# Patient Record
Sex: MALE | Race: WHITE | Employment: OTHER | URBAN - METROPOLITAN AREA
[De-identification: names, ages, dates, MRNs, and addresses within clinical notes are randomized per-mention and may not be internally consistent; named-entity substitution may affect disease eponyms.]

---

## 2017-06-02 ENCOUNTER — APPOINTMENT (OUTPATIENT)
Dept: LAB | Facility: CLINIC | Age: 82
End: 2017-06-02
Attending: RADIOLOGY
Payer: MEDICARE

## 2017-06-02 DIAGNOSIS — C61 MALIGNANT NEOPLASM OF PROSTATE (H): ICD-10-CM

## 2017-06-02 LAB — PSA SERPL-MCNC: 0.27 UG/L (ref 0–4)

## 2017-06-06 ENCOUNTER — OFFICE VISIT (OUTPATIENT)
Dept: RADIATION ONCOLOGY | Facility: CLINIC | Age: 82
End: 2017-06-06
Attending: RADIOLOGY
Payer: MEDICARE

## 2017-06-06 VITALS — SYSTOLIC BLOOD PRESSURE: 149 MMHG | WEIGHT: 164.3 LBS | HEART RATE: 71 BPM | DIASTOLIC BLOOD PRESSURE: 73 MMHG

## 2017-06-06 DIAGNOSIS — C61 MALIGNANT NEOPLASM OF PROSTATE (H): Primary | ICD-10-CM

## 2017-06-06 PROCEDURE — 99213 OFFICE O/P EST LOW 20 MIN: CPT | Performed by: RADIOLOGY

## 2017-06-06 NOTE — PROGRESS NOTES
RADIATION ONCOLOGY FOLLOW-UP  DATE OF VISIT: 2017     NAME: Juan Muniz   MRN: [id number]  : 10/21/1932     DISEASE TREATED: Intermediate-risk(Unfavorable, >50% biopsy cores+) adenocarcinoma of the prostate, [id number], [place] 3 + 4 = 7, pre-treatment PSA= 8       TYPE OF RADIOTHERAPY DELIVERED: 7560 cGy in [place] IMRT in 42 fractions on 10 MV (Elekta, 7 fields)     INTERVAL SINCE RADIOTHERAPY COMPLETION: 6 years and 10 months. Completed on 2010.      HORMONAL THERAPY:   6 Months of Lupron + XRT, First Lupron 2009, Last Lupron 2009    HISTORY OF PRESENT ILLNESS: Mr. Cruz is an 84-year-old man with prostate cancer treated definitively with combined radiotherapy and ADT. He continues to do well without any pressing issues or complaints today. Denies any changes in regards to his urinary symptoms. AUA is 6/35 (previously 4/35). PSA remains low at 0.27.      PHYSICAL EXAMINATION:  VITALS:/73  Pulse 71  Wt 74.5 kg (164 lb 4.8 oz)  GENERAL: Appears well, alert, oriented, and in NAD  ANGELIQUE: Deferred     LABS:  PSA  2017 0.27  2016 0.24  2015 0.25  2015  0.27   2014  0.28    2014  0.24    2014  0.38    2013  0.23    10/30/2012  0.27    2012      0.34  2011      0.17            IMPRESSION: cNED     RECOMMENDATION: Follow up in 2018 with repeat PSA.     The patient was seen and discussed with staff, Dr. Mccarty.     Fred Rachel MD  Resident Radiation Oncology     I saw the patient with the resident.  I agree with the resident's note and plan of care.      VALERIE Mccarty M.D.  Department of Radiation Oncology  [facility]  Patient Care Team:  Marie Billy PA-C as PCP - General

## 2017-06-06 NOTE — PROGRESS NOTES
FOLLOW-UP VISIT    Patient Name: Juan Muniz      : 10/21/1932     Age: 84 year old        ______________________________________________________________________________     Chief Complaint   Patient presents with     Cancer     6 yr 10 month fup for rad therapy to prostate bed     /73  Pulse 71  Wt 74.5 kg (164 lb 4.8 oz)     Date Radiation Completed: 7/23/10    Pain  Denies    Labs  Other Labs: Yes: PSA  0.27    Imaging  None        PSA:   PSA   Date Value Ref Range Status   2017 0.27 0 - 4 ug/L Final     Comment:     Assay Method:  Chemiluminescence using Siemens Vista analyzer   2016 0.24 0 - 4 ug/L Final   2015 0.25 0 - 4 ug/L Final   2015 0.27 0 - 4 ug/L Final   2014 0.28 0 - 4 ug/L Final   2014 0.24 0 - 4 ug/L Final     Comment:     PSA results are about 7% lower than our prior method due to a methodology   change   on 2011.     2014 0.38 0 - 4 ug/L Final     Comment:     PSA results are about 7% lower than our prior method due to a methodology   change   on 2011.   2013 0.23 0 - 4 ug/L Final     Comment:     PSA results are about 7% lower than our prior method due to a methodology   change   on 2011.   10/30/2012 0.27 0 - 4 ug/L Final     Comment:     PSA results are about 7% lower than our prior method due to a methodology   change   on 2011.   2012 0.34 0 - 4 ug/L Final     Comment:     PSA results are about 7% lower than our prior method due to a methodology   change   on 2011.     Testosterone Level: No results found for: TESTOSTTOTAL    On-Study AUA Symptom Score (PQ)       Diarrhea: 0- None    Constipation: 0- None      Other Appointments:     MD Name:  Appointment Date:    MD Name: Appointment Date:   MD Name: Appointment Date:   Other Appointment Notes:     Residual Radiation side effect: none     Additional Instructions:

## 2017-06-06 NOTE — MR AVS SNAPSHOT
After Visit Summary   6/6/2017    Juan Muniz    MRN: [id number]           Patient Information     Date Of Birth          10/21/1932        Visit Information        Provider Department      6/6/2017 10:00 AM Idalmis Mccarty MD Radiation Oncology Clinic        Today's Diagnoses     Malignant neoplasm of prostate (H)    -  1       Follow-ups after your visit        Follow-up notes from your care team     Return in about 14 months (around 7/23/2018).      Your next 10 appointments already scheduled     Jun 04, 2018 10:30 AM CDT   LAB with ACUTE CARE LAB [facility] [place], Lab ([facility])    [address] [address]              Patient must bring picture ID.  Patient should be prepared to give a urine specimen  Please do not eat 10-12 hours before your appointment if you are coming in fasting for labs on lipids, cholesterol, or glucose (sugar).  Pregnant women should follow their Care Team instructions. Water with medications is okay. Do not drink coffee or other fluids.   If you have concerns about taking  your medications, please ask at office or if scheduling via RAREFORM, send a message by clicking on Secure Messaging, Message Your Care Team.            Jun 06, 2018 10:30 AM CDT   Return Visit with Idalmis Mccarty MD   Radiation Oncology Clinic ([facility])    [facility]  1st Floor  [address] [address] [phone]              Future tests that were ordered for you today     Open Future Orders        Priority Expected Expires Ordered    PSA tumor marker Routine 6/6/2018 12/3/2018 6/6/2017            Who to contact     Please call your clinic at [phone] to:    Ask questions about your health    Make or cancel appointments    Discuss your medicines    Learn about your test results    Speak to your doctor   If you have compliments or concerns about an  experience at your clinic, or if you wish to file a complaint, please contact [facility] Patient Relations at [phone] or email us at [email or web address]         Additional Information About Your Visit        NextPrincipleshart Information     Priori Data is an electronic gateway that provides easy, online access to your medical records. With Priori Data, you can request a clinic appointment, read your test results, renew a prescription or communicate with your care team.     To sign up for Priori Data visit the website at [email or web address]   You will be asked to enter the access code listed below, as well as some personal information. Please follow the directions to create your username and password.     Your access code is: [email or web address]  Expires: 2017 10:25 AM     Your access code will  in 90 days. If you need help or a new code, please contact your [facility] or call [phone] for assistance.        Care EveryWhere ID     This is your Care EveryWhere ID. This could be used by other organizations to access your [place] medical records  [id number]        Your Vitals Were     Pulse                   71            Blood Pressure from Last 3 Encounters:   17 149/73   16 149/73   06/22/15 147/60    Weight from Last 3 Encounters:   17 74.5 kg (164 lb 4.8 oz)   16 74.3 kg (163 lb 12.8 oz)   06/22/15 74.6 kg (164 lb 6.4 oz)               Primary Care Provider Office Phone # Fax #    Marie Billy PA-C [phone]       [facility] [address] [place] [address]        Thank you!     Thank you for choosing RADIATION ONCOLOGY CLINIC  for your care. Our goal is always to provide you with excellent care. Hearing back from our patients is one way we can continue to improve our services. Please take a few minutes to complete the written survey that you may receive in the mail after your visit with us. Thank  you!             Your Updated Medication List - Protect others around you: Learn how to safely use, store and throw away your medicines at www.disposemymeds.org.          This list is accurate as of: 6/6/17 11:02 AM.  Always use your most recent med list.                   Brand Name Dispense Instructions for use    ASPIRIN PO      Take 81 mg by mouth daily       CALCITRIOL PO      Take 0.25 mcg by mouth daily       glipiZIDE XL 10 MG 24 hr tablet   Generic drug:  glipiZIDE      Take 1 tablet by mouth daily.       JANUVIA 100 MG tablet   Generic drug:  sitagliptin      Take  by mouth daily.       latanoprost 0.005 % ophthalmic solution    XALATAN     Place 1 drop Into the left eye At Bedtime       LIPITOR 10 MG tablet   Generic drug:  atorvastatin      Take 1 tablet by mouth At Bedtime. Take daily at bedtime       metoprolol 50 MG tablet    LOPRESSOR     Take 50 mg by mouth daily.       SYNTHROID PO      Take  by mouth.

## 2017-06-06 NOTE — LETTER
2017       RE: Juan Muniz  [address] [facility] [address]     Dear Colleague,    Thank you for referring your patient, Juan Muniz, to the RADIATION ONCOLOGY CLINIC. Please see a copy of my visit note below.    RADIATION ONCOLOGY FOLLOW-UP  DATE OF VISIT: 2017     NAME: Juan Muniz   MRN: [id number]  : 10/21/1932     DISEASE TREATED: Intermediate-risk(Unfavorable, >50% biopsy cores+) adenocarcinoma of the prostate, [id number], [place] 3 + 4 = 7, pre-treatment PSA= 8       TYPE OF RADIOTHERAPY DELIVERED: 7560 cGy in [place] IMRT in 42 fractions on 10 MV (Elekta, 7 fields)     INTERVAL SINCE RADIOTHERAPY COMPLETION: 6 years and 10 months. Completed on 2010.      HORMONAL THERAPY:   6 Months of Lupron + XRT, First Lupron 2009, Last Lupron 2009    HISTORY OF PRESENT ILLNESS: Mr. Cruz is an 84-year-old man with prostate cancer treated definitively with combined radiotherapy and ADT. He continues to do well without any pressing issues or complaints today. Denies any changes in regards to his urinary symptoms. AUA is 6/35 (previously 4/35). PSA remains low at 0.27.      PHYSICAL EXAMINATION:  VITALS:/73  Pulse 71  Wt 74.5 kg (164 lb 4.8 oz)  GENERAL: Appears well, alert, oriented, and in NAD  ANGELIQUE: Deferred     LABS:  PSA  2017 0.27  2016 0.24  2015 0.25  2015  0.27   2014  0.28    2014  0.24    2014  0.38    2013  0.23    10/30/2012  0.27    2012      0.34  2011      0.17            IMPRESSION: cNED     RECOMMENDATION: Follow up in 2018 with repeat PSA.     The patient was seen and discussed with staff, Dr. Mccarty.     Fred Rachel MD  Resident Radiation Oncology     I saw the patient with the resident.  I agree with the resident's note and plan of care.      VALERIE Mccarty M.D.  Department of Radiation Oncology  [facility]    CC  Patient Care Team:  Marie Billy PA-C as  PCP - General    FOLLOW-UP VISIT    Patient Name: Juan Muniz      : 10/21/1932     Age: 84 year old        ______________________________________________________________________________     Chief Complaint   Patient presents with     Cancer     6 yr 10 month fup for rad therapy to prostate bed     /73  Pulse 71  Wt 74.5 kg (164 lb 4.8 oz)     Date Radiation Completed: 7/23/10    Pain  Denies    Labs  Other Labs: Yes: PSA  0.27    Imaging  None        PSA:   PSA   Date Value Ref Range Status   2017 0.27 0 - 4 ug/L Final     Comment:     Assay Method:  Chemiluminescence using Siemens Vista analyzer   2016 0.24 0 - 4 ug/L Final   2015 0.25 0 - 4 ug/L Final   2015 0.27 0 - 4 ug/L Final   2014 0.28 0 - 4 ug/L Final   2014 0.24 0 - 4 ug/L Final     Comment:     PSA results are about 7% lower than our prior method due to a methodology   change   on 2011.     2014 0.38 0 - 4 ug/L Final     Comment:     PSA results are about 7% lower than our prior method due to a methodology   change   on 2011.   2013 0.23 0 - 4 ug/L Final     Comment:     PSA results are about 7% lower than our prior method due to a methodology   change   on 2011.   10/30/2012 0.27 0 - 4 ug/L Final     Comment:     PSA results are about 7% lower than our prior method due to a methodology   change   on 2011.   2012 0.34 0 - 4 ug/L Final     Comment:     PSA results are about 7% lower than our prior method due to a methodology   change   on 2011.     Testosterone Level: No results found for: TESTOSTTOTAL    On-Study AUA Symptom Score (PQ)       Diarrhea: 0- None    Constipation: 0- None      Other Appointments:     MD Name:  Appointment Date:    MD Name: Appointment Date:   MD Name: Appointment Date:   Other Appointment Notes:     Residual Radiation side effect: none     Additional Instructions:       Again, thank you for allowing me  to participate in the care of your patient.      Sincerely,    Idalmis Mccarty MD

## 2018-06-06 ENCOUNTER — OFFICE VISIT (OUTPATIENT)
Dept: RADIATION ONCOLOGY | Facility: CLINIC | Age: 83
End: 2018-06-06
Attending: RADIOLOGY
Payer: MEDICARE

## 2018-06-06 VITALS — WEIGHT: 161.7 LBS | HEART RATE: 63 BPM | SYSTOLIC BLOOD PRESSURE: 159 MMHG | DIASTOLIC BLOOD PRESSURE: 71 MMHG

## 2018-06-06 DIAGNOSIS — C61 MALIGNANT NEOPLASM OF PROSTATE (H): Primary | ICD-10-CM

## 2018-06-06 PROCEDURE — G0463 HOSPITAL OUTPT CLINIC VISIT: HCPCS | Performed by: RADIOLOGY

## 2018-06-06 NOTE — LETTER
2018       RE: Juan Muniz  [address] [facility] [address]     Dear Colleague,    Thank you for referring your patient, Juan Muniz, to the RADIATION ONCOLOGY CLINIC. Please see a copy of my visit note below.    RADIATION ONCOLOGY FOLLOW-UP  DATE OF VISIT: 2018     NAME: Juan Muniz   MRN: [id number]  : 10/21/1932     DISEASE TREATED: Intermediate-risk(Unfavorable, >50% biopsy cores+) adenocarcinoma of the prostate, [id number], [place] 3 + 4 = 7, pre-treatment PSA= 8       TYPE OF RADIOTHERAPY DELIVERED: 7560 cGy in [place] IMRT in 42 fractions on 10 MV (Elekta, 7 fields)     INTERVAL SINCE RADIOTHERAPY COMPLETION: 7 years and 11 months. Completed on 2010.      HORMONAL THERAPY:   6 Months of Lupron + XRT, First Lupron 2009, Last Lupron 2009    HISTORY OF PRESENT ILLNESS: Mr. Cruz is an 85-year-old man with prostate cancer treated definitively with combined radiotherapy and ADT. He continues to do well without any pressing issues or complaints today. Denies any changes in regards to his urinary symptoms. AUA is 6/35 (previously 6/35).LU score is 2/25.  PSA remains low at 0.21.      PHYSICAL EXAMINATION:  VITALS: Vitals: /71  Pulse 63  Wt 73.3 kg (161 lb 11.2 oz)  BMI= There is no height or weight on file to calculate BMI.  GENERAL: Appears well, alert, oriented, and in NAD  ANGELIQUE: Deferred     LABS:  PSA  2018 0.21  2017 0.27  2016 0.24  2015 0.25  2015  0.27   2014  0.28    2014  0.24    2014  0.38    2013  0.23    10/30/2012  0.27    2012      0.34  2011      0.17       IMPRESSION: cNED     RECOMMENDATION: Follow up in 1 year with a NP. We discussed about a referral to urology for erectile dysfunction. However, he wishes to think about it. I recommend Dr. Reva Zimmerman for a consultation.     VALERIE Mccarty M.D.  Department of Radiation Oncology  [facility]  [facility]  Patient Care Team:  Marie Billy PA-C as PCP - General    FOLLOW-UP VISIT    Patient Name: Juan Muniz      : 10/21/1932     Age: 85 year old        ______________________________________________________________________________     Chief Complaint   Patient presents with     Cancer     8 yr fup for rad therapty to prostate bed     Wt 73.3 kg (161 lb 11.2 oz)     Date Radiation Completed: 7/23/10    Pain  Denies    Labs  Other Labs: Yes: PSA  0.21    Imaging  None        PSA:   PSA   Date Value Ref Range Status   2018 0.21 0 - 4 ug/L Final     Comment:     Assay Method:  Chemiluminescence using Siemens Vista analyzer   2017 0.27 0 - 4 ug/L Final     Comment:     Assay Method:  Chemiluminescence using Siemens Vista analyzer   2016 0.24 0 - 4 ug/L Final   2015 0.25 0 - 4 ug/L Final   2015 0.27 0 - 4 ug/L Final   2014 0.28 0 - 4 ug/L Final   2014 0.24 0 - 4 ug/L Final     Comment:     PSA results are about 7% lower than our prior method due to a methodology   change   on 2011.     2014 0.38 0 - 4 ug/L Final     Comment:     PSA results are about 7% lower than our prior method due to a methodology   change   on 2011.   2013 0.23 0 - 4 ug/L Final     Comment:     PSA results are about 7% lower than our prior method due to a methodology   change   on 2011.   10/30/2012 0.27 0 - 4 ug/L Final     Comment:     PSA results are about 7% lower than our prior method due to a methodology   change   on 2011.     Testosterone Level: No results found for: TESTOSTTOTAL    On-Study AUA Symptom Score (PQ)  AUA (American Urological Association) Symptom Score  1. Over the past month, how often have you had a sensation of not emptying your bladder completely after you finished urinating?: Less than 1 time in 5  2. Over the past month, how often have you had to urinate again less than two hours after you finished  urinating?: Less than 1 time in 5  3. Over the past month, how often have you found you stopped and started again several times when you urinated?: Less than 1 time in 5  4. Over the past month, how often have you found it difficult to postpone urination?: Not at all  5. Over the past month, how often have you had a weak urine stream?: Less than 1 time in 5  6. Over the past month, how often have you had to push or strain to begin urinating?: Less than 1 time in 5  7. Over the past month, how many times did you typically get up to urinate from the time you went to bed at night until the time you got up in the morning?: 1 time  AUA Score: 6    Diarrhea: 0- None    Constipation: 0- None      Other Appointments:     MD Name:  Appointment Date:    MD Name: Appointment Date:   MD Name: Appointment Date:   Other Appointment Notes:     Residual Radiation side effect: none     Additional Instructions:     Nurse face-to-face time: Level 2:  5 min face to face time          Again, thank you for allowing me to participate in the care of your patient.      Sincerely,    Idalmis Mccarty MD

## 2018-06-06 NOTE — PROGRESS NOTES
FOLLOW-UP VISIT    Patient Name: Juan Muniz      : 10/21/1932     Age: 85 year old        ______________________________________________________________________________     Chief Complaint   Patient presents with     Cancer     8 yr fup for rad therapty to prostate bed     Wt 73.3 kg (161 lb 11.2 oz)     Date Radiation Completed: 7/23/10    Pain  Denies    Labs  Other Labs: Yes: PSA  0.21    Imaging  None        PSA:   PSA   Date Value Ref Range Status   2018 0.21 0 - 4 ug/L Final     Comment:     Assay Method:  Chemiluminescence using Siemens Vista analyzer   2017 0.27 0 - 4 ug/L Final     Comment:     Assay Method:  Chemiluminescence using Siemens Vista analyzer   2016 0.24 0 - 4 ug/L Final   2015 0.25 0 - 4 ug/L Final   2015 0.27 0 - 4 ug/L Final   2014 0.28 0 - 4 ug/L Final   2014 0.24 0 - 4 ug/L Final     Comment:     PSA results are about 7% lower than our prior method due to a methodology   change   on 2011.     2014 0.38 0 - 4 ug/L Final     Comment:     PSA results are about 7% lower than our prior method due to a methodology   change   on 2011.   2013 0.23 0 - 4 ug/L Final     Comment:     PSA results are about 7% lower than our prior method due to a methodology   change   on 2011.   10/30/2012 0.27 0 - 4 ug/L Final     Comment:     PSA results are about 7% lower than our prior method due to a methodology   change   on 2011.     Testosterone Level: No results found for: TESTOSTTOTAL    On-Study AUA Symptom Score (PQ)  AUA (American Urological Association) Symptom Score  1. Over the past month, how often have you had a sensation of not emptying your bladder completely after you finished urinating?: Less than 1 time in 5  2. Over the past month, how often have you had to urinate again less than two hours after you finished urinating?: Less than 1 time in 5  3. Over the past month, how often have you  found you stopped and started again several times when you urinated?: Less than 1 time in 5  4. Over the past month, how often have you found it difficult to postpone urination?: Not at all  5. Over the past month, how often have you had a weak urine stream?: Less than 1 time in 5  6. Over the past month, how often have you had to push or strain to begin urinating?: Less than 1 time in 5  7. Over the past month, how many times did you typically get up to urinate from the time you went to bed at night until the time you got up in the morning?: 1 time  AUA Score: 6    Diarrhea: 0- None    Constipation: 0- None      Other Appointments:     MD Name:  Appointment Date:    MD Name: Appointment Date:   MD Name: Appointment Date:   Other Appointment Notes:     Residual Radiation side effect: none     Additional Instructions:     Nurse face-to-face time: Level 2:  5 min face to face time

## 2018-06-06 NOTE — MR AVS SNAPSHOT
After Visit Summary   2018    Juan Muniz    MRN: [id number]           Patient Information     Date Of Birth          10/21/1932        Visit Information        Provider Department      2018 10:30 AM Idalmis Mccarty MD Radiation Oncology Clinic        Today's Diagnoses     Malignant neoplasm of prostate (H)    -  1       Follow-ups after your visit        Follow-up notes from your care team     Return in about 1 year (around 2019) for F/U with NP(Carol), PSA before next visit (separate date).      Future tests that were ordered for you today     Open Future Orders        Priority Expected Expires Ordered    PSA tumor marker Routine 2019            Who to contact     Please call your clinic at [phone] to:    Ask questions about your health    Make or cancel appointments    Discuss your medicines    Learn about your test results    Speak to your doctor            Additional Information About Your Visit        MyChart Information     wali is an electronic gateway that provides easy, online access to your medical records. With wali, you can request a clinic appointment, read your test results, renew a prescription or communicate with your care team.     To sign up for Xormist visit the website at [email or web address]   You will be asked to enter the access code listed below, as well as some personal information. Please follow the directions to create your username and password.     Your access code is: [email or web address]  Expires: 2018 10:39 AM     Your access code will  in 90 days. If you need help or a new code, please contact your [facility] or call [phone] for assistance.        Care EveryWhere ID     This is your Care EveryWhere ID. This could be used by other organizations to access your [place] medical records  [id number]        Your Vitals Were     Pulse                   63            Blood  Pressure from Last 3 Encounters:   06/06/18 159/71   06/06/17 149/73   06/06/16 149/73    Weight from Last 3 Encounters:   06/06/18 73.3 kg (161 lb 11.2 oz)   06/06/17 74.5 kg (164 lb 4.8 oz)   06/06/16 74.3 kg (163 lb 12.8 oz)               Primary Care Provider Office Phone # Fax #    Marie Billy PA-C [phone]       [facility] [address] [place] [address]        Equal Access to Services     [facility]: Hadii milady ku hadasho Soomaali, waaxda luqadaha, qaybta kaalmada erin, dora montes . So [facility] [phone].    ATENCIÓN: Si habla español, tiene a nash disposición servicios gratuitos de asistencia lingüística. [facility] [phone].    We comply with applicable federal civil rights laws and [place] laws. We do not discriminate on the basis of race, color, national origin, age, disability, sex, sexual orientation, or gender identity.            Thank you!     Thank you for choosing RADIATION ONCOLOGY CLINIC  for your care. Our goal is always to provide you with excellent care. Hearing back from our patients is one way we can continue to improve our services. Please take a few minutes to complete the written survey that you may receive in the mail after your visit with us. Thank you!             Your Updated Medication List - Protect others around you: Learn how to safely use, store and throw away your medicines at www.disposemymeds.org.          This list is accurate as of 6/6/18 10:39 AM.  Always use your most recent med list.                   Brand Name Dispense Instructions for use Diagnosis    ASPIRIN PO      Take 81 mg by mouth daily        CALCITRIOL PO      Take 0.25 mcg by mouth daily        glipiZIDE XL 10 MG 24 hr tablet   Generic drug:  glipiZIDE      Take 1 tablet by mouth daily.        JANUVIA 100 MG tablet   Generic drug:  sitagliptin      Take  by mouth daily.        latanoprost 0.005 % ophthalmic solution    XALATAN     Place 1 drop Into  the left eye At Bedtime        LIPITOR 10 MG tablet   Generic drug:  atorvastatin      Take 1 tablet by mouth At Bedtime. Take daily at bedtime        metoprolol tartrate 50 MG tablet    LOPRESSOR     Take 50 mg by mouth daily.        SYNTHROID PO      Take  by mouth.

## 2018-06-06 NOTE — PROGRESS NOTES
RADIATION ONCOLOGY FOLLOW-UP  DATE OF VISIT: 2018     NAME: Juan Muniz   MRN: [id number]  : 10/21/1932     DISEASE TREATED: Intermediate-risk(Unfavorable, >50% biopsy cores+) adenocarcinoma of the prostate, [id number], [place] 3 + 4 = 7, pre-treatment PSA= 8       TYPE OF RADIOTHERAPY DELIVERED: 7560 cGy in [place] IMRT in 42 fractions on 10 MV (Elekta, 7 fields)     INTERVAL SINCE RADIOTHERAPY COMPLETION: 7 years and 11 months. Completed on 2010.      HORMONAL THERAPY:   6 Months of Lupron + XRT, First Lupron 2009, Last Lupron 2009    HISTORY OF PRESENT ILLNESS: Mr. Cruz is an 85-year-old man with prostate cancer treated definitively with combined radiotherapy and ADT. He continues to do well without any pressing issues or complaints today. Denies any changes in regards to his urinary symptoms. AUA is 6/35 (previously 6/35).LU score is 2/25.  PSA remains low at 0.21.      PHYSICAL EXAMINATION:  VITALS: Vitals: /71  Pulse 63  Wt 73.3 kg (161 lb 11.2 oz)  BMI= There is no height or weight on file to calculate BMI.  GENERAL: Appears well, alert, oriented, and in NAD  ANGELIQUE: Deferred     LABS:  PSA  2018 0.21  2017 0.27  2016 0.24  2015 0.25  2015  0.27   2014  0.28    2014  0.24    2014  0.38    2013  0.23    10/30/2012  0.27    2012      0.34  2011      0.17       IMPRESSION: cNED     RECOMMENDATION: Follow up in 1 year with a NP. We discussed about a referral to urology for erectile dysfunction. However, he wishes to think about it. I recommend Dr. Reva Zimmerman for a consultation.     VALERIE Mccarty M.D.  Department of Radiation Oncology  [facility]    CC  Patient Care Team:  Marie Billy PA-C as PCP - General

## 2019-05-31 DIAGNOSIS — C61 MALIGNANT NEOPLASM OF PROSTATE (H): ICD-10-CM

## 2019-05-31 LAB — PSA SERPL-MCNC: 0.35 UG/L (ref 0–4)

## 2019-06-06 ENCOUNTER — OFFICE VISIT (OUTPATIENT)
Dept: RADIATION ONCOLOGY | Facility: CLINIC | Age: 84
End: 2019-06-06
Attending: NURSE PRACTITIONER
Payer: MEDICARE

## 2019-06-06 VITALS — HEART RATE: 64 BPM | DIASTOLIC BLOOD PRESSURE: 58 MMHG | WEIGHT: 158 LBS | SYSTOLIC BLOOD PRESSURE: 127 MMHG

## 2019-06-06 DIAGNOSIS — C61 PROSTATE CANCER (H): Primary | ICD-10-CM

## 2019-06-06 PROCEDURE — G0463 HOSPITAL OUTPT CLINIC VISIT: HCPCS | Performed by: NURSE PRACTITIONER

## 2019-06-06 RX ORDER — ATORVASTATIN CALCIUM 10 MG/1
10 TABLET, FILM COATED ORAL DAILY
COMMUNITY
Start: 2014-01-29

## 2019-06-06 RX ORDER — METOPROLOL TARTRATE 50 MG
50 TABLET ORAL 2 TIMES DAILY
COMMUNITY
Start: 2018-10-10

## 2019-06-06 RX ORDER — LEVOTHYROXINE SODIUM 88 UG/1
88 TABLET ORAL DAILY
COMMUNITY
Start: 2014-01-09

## 2019-06-06 RX ORDER — AMLODIPINE BESYLATE 5 MG/1
5 TABLET ORAL DAILY
COMMUNITY
Start: 2017-05-18

## 2019-06-06 RX ORDER — GLIMEPIRIDE 2 MG/1
2 TABLET ORAL DAILY
COMMUNITY
Start: 2018-10-10

## 2019-06-06 NOTE — LETTER
2019       RE: Juan Muniz  [address] [facility] [address]     Dear Colleague,    Thank you for referring your patient, Juan Muniz, to the RADIATION ONCOLOGY CLINIC. Please see a copy of my visit note below.    FOLLOW-UP VISIT    Patient Name: Juan Muniz      : 10/21/1932     Age: 86 year old        ______________________________________________________________________________     Chief Complaint   Patient presents with     Cancer     Follow-up to Radiation Therapy.     /58   Pulse 64   Wt 71.7 kg (158 lb)      Date Radiation Completed: 7/23/10    Pain  Denies    Labs  Other Labs: Yes:   PSA   Date Value Ref Range Status   2019 0.35 0 - 4 ug/L Final     Comment:     Assay Method:  Chemiluminescence using Siemens Vista analyzer       Imaging  None        PSA:   PSA   Date Value Ref Range Status   2019 0.35 0 - 4 ug/L Final     Comment:     Assay Method:  Chemiluminescence using Siemens Vista analyzer   2018 0.21 0 - 4 ug/L Final     Comment:     Assay Method:  Chemiluminescence using Siemens Vista analyzer   2017 0.27 0 - 4 ug/L Final     Comment:     Assay Method:  Chemiluminescence using Siemens Vista analyzer   2016 0.24 0 - 4 ug/L Final   2015 0.25 0 - 4 ug/L Final   2015 0.27 0 - 4 ug/L Final   2014 0.28 0 - 4 ug/L Final   2014 0.24 0 - 4 ug/L Final     Comment:     PSA results are about 7% lower than our prior method due to a methodology   change   on 2011.     2014 0.38 0 - 4 ug/L Final     Comment:     PSA results are about 7% lower than our prior method due to a methodology   change   on 2011.   2013 0.23 0 - 4 ug/L Final     Comment:     PSA results are about 7% lower than our prior method due to a methodology   change   on 2011.     Testosterone Level: No results found for: TESTOSTTOTAL    On-Study AUA Symptom Score (PQ)  AUA (American Urological Association)  Symptom Score  1. Over the past month, how often have you had a sensation of not emptying your bladder completely after you finished urinating?: Less than 1 time in 5  2. Over the past month, how often have you had to urinate again less than two hours after you finished urinating?: Not at all  3. Over the past month, how often have you found you stopped and started again several times when you urinated?: Not at all  4. Over the past month, how often have you found it difficult to postpone urination?: Not at all  5. Over the past month, how often have you had a weak urine stream?: Less than 1 time in 5  6. Over the past month, how often have you had to push or strain to begin urinating?: Not at all  7. Over the past month, how many times did you typically get up to urinate from the time you went to bed at night until the time you got up in the morning?: 1 time  AUA Score: 3    Diarrhea: 0- None    Constipation: 0- None    Residual Radiation side effect: None     Additional Instructions:     Nurse face-to-face time: Level 4:  15 min face to face time          RADIATION ONCOLOGY FOLLOW-UP  DATE OF VISIT: 2019     NAME: Juan Muniz   MRN: [id number]  : 10/21/1932     DISEASE TREATED: Intermediate-risk(Unfavorable, >50% biopsy cores+) adenocarcinoma of the prostate, [id number], [place] 3 + 4 = 7, pre-treatment PSA= 8       TYPE OF RADIOTHERAPY DELIVERED: 7560 cGy in [place] IMRT in 42 fractions on 10 MV (Elekta, 7 fields)     INTERVAL SINCE RADIOTHERAPY COMPLETION: 8 years and 11 months. Completed on 2010.      HORMONAL THERAPY:   6 Months of Lupron + XRT, First Lupron 2009, Last Lupron 2009    HISTORY OF PRESENT ILLNESS: Mr. Cruz is an 85-year-old man with prostate cancer treated definitively with combined radiotherapy and ADT. He continues to do well without any pressing issues or complaints today. Denies any changes in regards to his urinary symptoms. AUA is 3/35 (previously ). LU score-  nicely refused to complete form.  PSA remains low at 0.35. He is unsure when his last colonoscopy was.  Denies any bowel issues.    He is getting his boat ready for sailing later today.  He has a sailboat down in [place].     PHYSICAL EXAMINATION:  VITALS: Vitals: /58   Pulse 64   Wt 71.7 kg (158 lb)   GENERAL: Appears well, alert, oriented, and in NAD  ANGELIQUE: Deferred     LABS:  PSA  5/31/19           0.35  06/4/2018 0.21  06/06/2017 0.27  06/02/2016 0.24  12/07/2015 0.25  06/19/2015  0.27   12/29/2014  0.28    07/11/2014  0.24    01/14/2014  0.38    05/20/2013  0.23    10/30/2012  0.27    05/11/2012      0.34  12/12/2011      0.17       IMPRESSION: cNED     RECOMMENDATION: Follow up in 1 year with PSA prior.        Carol Ribeiro NP  Department of Radiation Oncology  [facility]        Again, thank you for allowing me to participate in the care of your patient.      Sincerely,    ENOCH Farrar CNP

## 2019-06-06 NOTE — PROGRESS NOTES
FOLLOW-UP VISIT    Patient Name: Juan Muniz      : 10/21/1932     Age: 86 year old        ______________________________________________________________________________     Chief Complaint   Patient presents with     Cancer     Follow-up to Radiation Therapy.     /58   Pulse 64   Wt 71.7 kg (158 lb)      Date Radiation Completed: 7/23/10    Pain  Denies    Labs  Other Labs: Yes:   PSA   Date Value Ref Range Status   2019 0.35 0 - 4 ug/L Final     Comment:     Assay Method:  Chemiluminescence using Siemens Vista analyzer       Imaging  None        PSA:   PSA   Date Value Ref Range Status   2019 0.35 0 - 4 ug/L Final     Comment:     Assay Method:  Chemiluminescence using Siemens Vista analyzer   2018 0.21 0 - 4 ug/L Final     Comment:     Assay Method:  Chemiluminescence using Siemens Vista analyzer   2017 0.27 0 - 4 ug/L Final     Comment:     Assay Method:  Chemiluminescence using Siemens Vista analyzer   2016 0.24 0 - 4 ug/L Final   2015 0.25 0 - 4 ug/L Final   2015 0.27 0 - 4 ug/L Final   2014 0.28 0 - 4 ug/L Final   2014 0.24 0 - 4 ug/L Final     Comment:     PSA results are about 7% lower than our prior method due to a methodology   change   on 2011.     2014 0.38 0 - 4 ug/L Final     Comment:     PSA results are about 7% lower than our prior method due to a methodology   change   on 2011.   2013 0.23 0 - 4 ug/L Final     Comment:     PSA results are about 7% lower than our prior method due to a methodology   change   on 2011.     Testosterone Level: No results found for: TESTOSTTOTAL    On-Study AUA Symptom Score (PQ)  AUA (American Urological Association) Symptom Score  1. Over the past month, how often have you had a sensation of not emptying your bladder completely after you finished urinating?: Less than 1 time in 5  2. Over the past month, how often have you had to urinate again less than  two hours after you finished urinating?: Not at all  3. Over the past month, how often have you found you stopped and started again several times when you urinated?: Not at all  4. Over the past month, how often have you found it difficult to postpone urination?: Not at all  5. Over the past month, how often have you had a weak urine stream?: Less than 1 time in 5  6. Over the past month, how often have you had to push or strain to begin urinating?: Not at all  7. Over the past month, how many times did you typically get up to urinate from the time you went to bed at night until the time you got up in the morning?: 1 time  AUA Score: 3    Diarrhea: 0- None    Constipation: 0- None    Residual Radiation side effect: None     Additional Instructions:     Nurse face-to-face time: Level 4:  15 min face to face time

## 2019-06-06 NOTE — PROGRESS NOTES
RADIATION ONCOLOGY FOLLOW-UP  DATE OF VISIT: 2019     NAME: Juan Muniz   MRN: [id number]  : 10/21/1932     DISEASE TREATED: Intermediate-risk(Unfavorable, >50% biopsy cores+) adenocarcinoma of the prostate, [id number], [place] 3 + 4 = 7, pre-treatment PSA= 8       TYPE OF RADIOTHERAPY DELIVERED: 7560 cGy in [place] IMRT in 42 fractions on 10 MV (Elekta, 7 fields)     INTERVAL SINCE RADIOTHERAPY COMPLETION: 8 years and 11 months. Completed on 2010.      HORMONAL THERAPY:   6 Months of Lupron + XRT, First Lupron 2009, Last Lupron 2009    HISTORY OF PRESENT ILLNESS: Mr. Cruz is an 85-year-old man with prostate cancer treated definitively with combined radiotherapy and ADT. He continues to do well without any pressing issues or complaints today. Denies any changes in regards to his urinary symptoms. AUA is 3/35 (previously 6/35). LU score- nicely refused to complete form.  PSA remains low at 0.35. He is unsure when his last colonoscopy was.  Denies any bowel issues.    He is getting his boat ready for sailing later today.  He has a sailboat down in [place].     PHYSICAL EXAMINATION:  VITALS: Vitals: /58   Pulse 64   Wt 71.7 kg (158 lb)   GENERAL: Appears well, alert, oriented, and in NAD  ANGELIQUE: Deferred     LABS:  PSA  19           0.35  2018 0.21  2017 0.27  2016 0.24  2015 0.25  2015  0.27   2014  0.28    2014  0.24    2014  0.38    2013  0.23    10/30/2012  0.27    2012      0.34  2011      0.17       IMPRESSION: cNED     RECOMMENDATION: Follow up in 1 year with PSA prior.        Carol Ribeiro NP  Department of Radiation Oncology  [facility]

## 2019-10-01 ENCOUNTER — HEALTH MAINTENANCE LETTER (OUTPATIENT)
Age: 84
End: 2019-10-01

## 2019-12-15 ENCOUNTER — HEALTH MAINTENANCE LETTER (OUTPATIENT)
Age: 84
End: 2019-12-15

## 2020-06-01 DIAGNOSIS — C61 PROSTATE CANCER (H): ICD-10-CM

## 2020-06-01 LAB — PSA SERPL-MCNC: 0.18 UG/L (ref 0–4)

## 2020-06-02 ENCOUNTER — VIRTUAL VISIT (OUTPATIENT)
Dept: RADIATION ONCOLOGY | Facility: CLINIC | Age: 85
End: 2020-06-02
Attending: NURSE PRACTITIONER
Payer: COMMERCIAL

## 2020-06-02 DIAGNOSIS — C61 PROSTATE CANCER (H): Primary | ICD-10-CM

## 2020-06-02 NOTE — PROGRESS NOTES
"RADIATION ONCOLOGY FOLLOW-UP  DATE OF VISIT: 2020     Juan Muniz is a 87 year old male who is being evaluated via a billable telephone visit.      The patient has been notified of following:     \"This telephone visit will be conducted via a call between you and your physician/provider. We have found that certain health care needs can be provided without the need for a physical exam.  This service lets us provide the care you need with a short phone conversation.  If a prescription is necessary we can send it directly to your pharmacy.  If lab work is needed we can place an order for that and you can then stop by our lab to have the test done at a later time.    Telephone visits are billed at different rates depending on your insurance coverage. During this emergency period, for some insurers they may be billed the same as an in-person visit.  Please reach out to your insurance provider with any questions.    If during the course of the call the physician/provider feels a telephone visit is not appropriate, you will not be charged for this service.\"    Patient has given verbal consent for Telephone visit?  Yes    What phone number would you like to be contacted at?     How would you like to obtain your AVS? [employer]    Phone call duration: 6 minutes           NAME: Juan Muniz   MRN: [id number]  : 10/21/1932     DISEASE TREATED: Intermediate-risk(Unfavorable, >50% biopsy cores+) adenocarcinoma of the prostate, [id number], [place] 3 + 4 = 7, pre-treatment PSA= 8       TYPE OF RADIOTHERAPY DELIVERED: 7560 cGy in [place] IMRT in 42 fractions on 10 MV (Elekta, 7 fields)     INTERVAL SINCE RADIOTHERAPY COMPLETION: 9 years and 11 months. Completed on 2010.      HORMONAL THERAPY:   6 Months of Lupron + XRT, First Lupron 2009, Last Lupron 2009    HISTORY OF PRESENT ILLNESS: Mr. Cruz is an 85-year-old man with prostate cancer treated definitively with combined radiotherapy and ADT. He continues to " do well without any pressing issues or complaints today. Denies any changes in regards to his urinary symptoms. AUA is 3/35 (previously 6/35). LU score- has refused in the past.  PSA remains low at 0.18 down from 0.35 last year.  He is unsure when his last colonoscopy was.  Denies any bowel issues.    He is getting his boat ready for sailing later today.  He has a sailboat down in [place].  They just returned from [place].     PHYSICAL EXAMINATION:  VITALS: Vitals: There were no vitals taken for this visit.  GENERAL: Appears well, alert, oriented, and in NAD  ANGELIQUE: Deferred     LABS:  PSA  6/1/2020         0.18  5/31/19           0.35  06/4/2018 0.21  06/06/2017 0.27  06/02/2016 0.24  12/07/2015 0.25  06/19/2015  0.27   12/29/2014  0.28    07/11/2014  0.24    01/14/2014  0.38    05/20/2013  0.23    10/30/2012  0.27    05/11/2012      0.34  12/12/2011      0.17       IMPRESSION: cNED     RECOMMENDATION: Follow up in 1 year with PSA prior.        Carol Ribeiro NP  Department of Radiation Oncology  [facility]

## 2020-06-02 NOTE — LETTER
"    2020         RE: Juan Muniz  [address] [facility] [address]        Dear Colleague,    Thank you for referring your patient, Juan Muniz, to the RADIATION ONCOLOGY CLINIC. Please see a copy of my visit note below.    RADIATION ONCOLOGY FOLLOW-UP  DATE OF VISIT: 2020     Juan Muniz is a 87 year old male who is being evaluated via a billable telephone visit.      The patient has been notified of following:     \"This telephone visit will be conducted via a call between you and your physician/provider. We have found that certain health care needs can be provided without the need for a physical exam.  This service lets us provide the care you need with a short phone conversation.  If a prescription is necessary we can send it directly to your pharmacy.  If lab work is needed we can place an order for that and you can then stop by our lab to have the test done at a later time.    Telephone visits are billed at different rates depending on your insurance coverage. During this emergency period, for some insurers they may be billed the same as an in-person visit.  Please reach out to your insurance provider with any questions.    If during the course of the call the physician/provider feels a telephone visit is not appropriate, you will not be charged for this service.\"    Patient has given verbal consent for Telephone visit?  Yes    What phone number would you like to be contacted at?     How would you like to obtain your AVS? [employer]    Phone call duration: 6 minutes           NAME: Juan Muniz   MRN: [id number]  : 10/21/1932     DISEASE TREATED: Intermediate-risk(Unfavorable, >50% biopsy cores+) adenocarcinoma of the prostate, [id number], [place] 3 + 4 = 7, pre-treatment PSA= 8       TYPE OF RADIOTHERAPY DELIVERED: 7560 cGy in [place] IMRT in 42 fractions on 10 MV (Elekta, 7 fields)     INTERVAL SINCE RADIOTHERAPY COMPLETION: 9 years and 11 months. Completed on 2010.    "   HORMONAL THERAPY:   6 Months of Lupron + XRT, First Lupron 8/2009, Last Lupron 12/2009    HISTORY OF PRESENT ILLNESS: Mr. Cruz is an 85-year-old man with prostate cancer treated definitively with combined radiotherapy and ADT. He continues to do well without any pressing issues or complaints today. Denies any changes in regards to his urinary symptoms. AUA is 3/35 (previously 6/35). LU score- has refused in the past.  PSA remains low at 0.18 down from 0.35 last year.  He is unsure when his last colonoscopy was.  Denies any bowel issues.    He is getting his boat ready for sailing later today.  He has a sailboat down in [place].  They just returned from [place].     PHYSICAL EXAMINATION:  VITALS: Vitals: There were no vitals taken for this visit.  GENERAL: Appears well, alert, oriented, and in NAD  ANGELIQUE: Deferred     LABS:  PSA  6/1/2020         0.18  5/31/19           0.35  06/4/2018 0.21  06/06/2017 0.27  06/02/2016 0.24  12/07/2015 0.25  06/19/2015  0.27   12/29/2014  0.28    07/11/2014  0.24    01/14/2014  0.38    05/20/2013  0.23    10/30/2012  0.27    05/11/2012      0.34  12/12/2011      0.17       IMPRESSION: cNED     RECOMMENDATION: Follow up in 1 year with PSA prior.        Carol Ribeiro NP  Department of Radiation Oncology  [facility]

## 2021-01-15 ENCOUNTER — HEALTH MAINTENANCE LETTER (OUTPATIENT)
Age: 86
End: 2021-01-15

## 2021-05-24 DIAGNOSIS — C61 PROSTATE CANCER (H): ICD-10-CM

## 2021-05-24 LAB — PSA SERPL-MCNC: 0.19 UG/L (ref 0–4)

## 2021-05-24 PROCEDURE — 36415 COLL VENOUS BLD VENIPUNCTURE: CPT | Performed by: PATHOLOGY

## 2021-05-24 PROCEDURE — 84153 ASSAY OF PSA TOTAL: CPT | Performed by: PATHOLOGY

## 2021-06-03 ENCOUNTER — OFFICE VISIT (OUTPATIENT)
Dept: RADIATION ONCOLOGY | Facility: CLINIC | Age: 86
End: 2021-06-03
Attending: RADIOLOGY
Payer: COMMERCIAL

## 2021-06-03 DIAGNOSIS — C61 PROSTATE CANCER (H): Primary | ICD-10-CM

## 2021-06-03 NOTE — LETTER
6/3/2021      RE: Juan Muniz  [address] [facility] [address]       Dear Colleague,    Thank you for the opportunity to participate in the care of your patient, Juan Muniz, at the [facility] RADIATION ONCOLOGY at [facility]. Please see a copy of my visit note below.    RADIATION ONCOLOGY FOLLOW-UP  DATE OF VISIT: Fran 3, 2021         NAME: Juan Muniz   MRN: [id number]  : 10/21/1932     DISEASE TREATED: Intermediate-risk(Unfavorable, >50% biopsy cores+) adenocarcinoma of the prostate, [id number], [place] 3 + 4 = 7, pre-treatment PSA= 8       TYPE OF RADIOTHERAPY DELIVERED: 7560 cGy in [place] IMRT in 42 fractions on 10 MV (Elekta, 7 fields)     INTERVAL SINCE RADIOTHERAPY COMPLETION: 10 years and 11 months. Completed on 2010.      HORMONAL THERAPY:   6 Months of Lupron + XRT, First Lupron 2009, Last Lupron 2009    HISTORY OF PRESENT ILLNESS: Mr. Cruz is an 85-year-old man with prostate cancer treated definitively with combined radiotherapy and ADT. He continues to do well without any pressing issues or complaints today. Denies any changes in regards to his urinary symptoms. AUA is 3/35 (previously 6/35). LU score- has refused in the past.  PSA remains low at 0.19 up from last year, but down from 0.35 2 years ago.  He is unsure when his last colonoscopy was.  Denies any bowel issues.    He is getting his boat ready for sailing tomorrow.  He has a sailboat down in [place].  They just returned from [place].     PHYSICAL EXAMINATION:  VITALS: Vitals: There were no vitals taken for this visit.  GENERAL: Appears well, alert, oriented, and in NAD  ANGELIQUE: Deferred     LABS:  PSA  2021 0.19  2020         0.18  19           0.35  2018 0.21  2017 0.27  2016 0.24  2015 0.25  2015  0.27   2014  0.28    2014  0.24    2014  0.38    2013  0.23    10/30/2012  0.27     05/11/2012      0.34  12/12/2011      0.17       IMPRESSION: cNED     RECOMMENDATION: Follow up in 1 year with PSA prior.        Carol Ribeiro NP  Department of Radiation Oncology  [facility]

## 2021-06-03 NOTE — LETTER
6/3/2021         RE: Juan Muniz  [address] [facility] [place] [place] [address]        Dear Colleague,    Thank you for referring your patient, Juan Muniz, to the [facility] RADIATION ONCOLOGY. Please see a copy of my visit note below.    RADIATION ONCOLOGY FOLLOW-UP  DATE OF VISIT: Fran 3, 2021         NAME: Juan Muniz   MRN: [id number]  : 10/21/1932     DISEASE TREATED: Intermediate-risk(Unfavorable, >50% biopsy cores+) adenocarcinoma of the prostate, [id number], [place] 3 + 4 = 7, pre-treatment PSA= 8       TYPE OF RADIOTHERAPY DELIVERED: 7560 cGy in [place] IMRT in 42 fractions on 10 MV (Elekta, 7 fields)     INTERVAL SINCE RADIOTHERAPY COMPLETION: 10 years and 11 months. Completed on 2010.      HORMONAL THERAPY:   6 Months of Lupron + XRT, First Lupron 2009, Last Lupron 2009    HISTORY OF PRESENT ILLNESS: Mr. Cruz is an 85-year-old man with prostate cancer treated definitively with combined radiotherapy and ADT. He continues to do well without any pressing issues or complaints today. Denies any changes in regards to his urinary symptoms. AUA is 3/35 (previously 6/35). LU score- has refused in the past.  PSA remains low at 0.19 up from last year, but down from 0.35 2 years ago.  He is unsure when his last colonoscopy was.  Denies any bowel issues.    He is getting his boat ready for sailing tomorrow.  He has a sailboat down in [place].  They just returned from [place].     PHYSICAL EXAMINATION:  VITALS: Vitals: There were no vitals taken for this visit.  GENERAL: Appears well, alert, oriented, and in NAD  ANGELIQUE: Deferred     LABS:  PSA  2021 0.19  2020         0.18  19           0.35  2018 0.21  2017 0.27  2016 0.24  2015 0.25  2015  0.27   2014  0.28    2014  0.24    2014  0.38    2013  0.23    10/30/2012  0.27    2012      0.34  2011      0.17       IMPRESSION: cNED     RECOMMENDATION: Follow  up in 1 year with PSA prior.        Carol Ribeiro NP  Department of Radiation Oncology  [facility]        Again, thank you for allowing me to participate in the care of your patient.        Sincerely,        ENOCH Farrar CNP

## 2021-06-03 NOTE — PROGRESS NOTES
RADIATION ONCOLOGY FOLLOW-UP  DATE OF VISIT: Fran 3, 2021         NAME: Juan Muniz   MRN: [id number]  : 10/21/1932     DISEASE TREATED: Intermediate-risk(Unfavorable, >50% biopsy cores+) adenocarcinoma of the prostate, [id number], [place] 3 + 4 = 7, pre-treatment PSA= 8       TYPE OF RADIOTHERAPY DELIVERED: 7560 cGy in [place] IMRT in 42 fractions on 10 MV (Elekta, 7 fields)     INTERVAL SINCE RADIOTHERAPY COMPLETION: 10 years and 11 months. Completed on 2010.      HORMONAL THERAPY:   6 Months of Lupron + XRT, First Lupron 2009, Last Lupron 2009    HISTORY OF PRESENT ILLNESS: Mr. Cruz is an 85-year-old man with prostate cancer treated definitively with combined radiotherapy and ADT. He continues to do well without any pressing issues or complaints today. Denies any changes in regards to his urinary symptoms. AUA is 3/35 (previously 6/35). LU score- has refused in the past.  PSA remains low at 0.19 up from last year, but down from 0.35 2 years ago.  He is unsure when his last colonoscopy was.  Denies any bowel issues.    He is getting his boat ready for sailing tomorrow.  He has a sailboat down in [place].  They just returned from [place].     PHYSICAL EXAMINATION:  VITALS: Vitals: There were no vitals taken for this visit.  GENERAL: Appears well, alert, oriented, and in NAD  ANGELIQUE: Deferred     LABS:  PSA  2021 0.19  2020         0.18  19           0.35  2018 0.21  2017 0.27  2016 0.24  2015 0.25  2015  0.27   2014  0.28    2014  0.24    2014  0.38    2013  0.23    10/30/2012  0.27    2012      0.34  2011      0.17       IMPRESSION: cNED     RECOMMENDATION: Follow up in 1 year with PSA prior.        Carol Ribeiro NP  Department of Radiation Oncology  [facility]

## 2021-09-04 ENCOUNTER — HEALTH MAINTENANCE LETTER (OUTPATIENT)
Age: 86
End: 2021-09-04

## 2022-02-19 ENCOUNTER — HEALTH MAINTENANCE LETTER (OUTPATIENT)
Age: 87
End: 2022-02-19

## 2022-10-16 ENCOUNTER — HEALTH MAINTENANCE LETTER (OUTPATIENT)
Age: 87
End: 2022-10-16

## 2023-04-01 ENCOUNTER — HEALTH MAINTENANCE LETTER (OUTPATIENT)
Age: 88
End: 2023-04-01

## 2024-06-01 ENCOUNTER — HEALTH MAINTENANCE LETTER (OUTPATIENT)
Age: 89
End: 2024-06-01

## 2024-12-31 ENCOUNTER — TELEPHONE (OUTPATIENT)
Dept: FAMILY MEDICINE | Facility: CLINIC | Age: 89
End: 2024-12-31
Payer: COMMERCIAL

## 2024-12-31 NOTE — TELEPHONE ENCOUNTER
Pt was seen in [facility] yesterday, requesting appointment for UC. Pt will go to [facility] for follow up or contact PCP Marie Billy. # to primary clinic given.